# Patient Record
Sex: MALE | Race: WHITE | NOT HISPANIC OR LATINO | Employment: FULL TIME | URBAN - NONMETROPOLITAN AREA
[De-identification: names, ages, dates, MRNs, and addresses within clinical notes are randomized per-mention and may not be internally consistent; named-entity substitution may affect disease eponyms.]

---

## 2022-03-04 ENCOUNTER — APPOINTMENT (OUTPATIENT)
Dept: CT IMAGING | Facility: HOSPITAL | Age: 27
End: 2022-03-04

## 2022-03-04 ENCOUNTER — HOSPITAL ENCOUNTER (EMERGENCY)
Facility: HOSPITAL | Age: 27
Discharge: HOME | End: 2022-03-05
Attending: STUDENT IN AN ORGANIZED HEALTH CARE EDUCATION/TRAINING PROGRAM | Admitting: EMERGENCY MEDICINE

## 2022-03-04 DIAGNOSIS — R46.89 BEHAVIORAL CHANGE: Primary | ICD-10-CM

## 2022-03-04 DIAGNOSIS — T07.XXXA ABRASIONS OF MULTIPLE SITES: ICD-10-CM
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
## 2022-03-04 PROCEDURE — 90715 TDAP VACCINE 7 YRS/> IM: CPT | Performed by: STUDENT IN AN ORGANIZED HEALTH CARE EDUCATION/TRAINING PROGRAM

## 2022-03-04 PROCEDURE — 99285 EMERGENCY DEPT VISIT HI MDM: CPT

## 2022-03-04 PROCEDURE — 85025 COMPLETE CBC W/AUTO DIFF WBC: CPT | Performed by: STUDENT IN AN ORGANIZED HEALTH CARE EDUCATION/TRAINING PROGRAM

## 2022-03-04 PROCEDURE — 70486 CT MAXILLOFACIAL W/O DYE: CPT

## 2022-03-04 PROCEDURE — 72125 CT NECK SPINE W/O DYE: CPT

## 2022-03-04 PROCEDURE — 25010000002 ONDANSETRON PER 1 MG: Performed by: STUDENT IN AN ORGANIZED HEALTH CARE EDUCATION/TRAINING PROGRAM

## 2022-03-04 PROCEDURE — 85730 THROMBOPLASTIN TIME PARTIAL: CPT | Performed by: STUDENT IN AN ORGANIZED HEALTH CARE EDUCATION/TRAINING PROGRAM

## 2022-03-04 PROCEDURE — 70450 CT HEAD/BRAIN W/O DYE: CPT

## 2022-03-04 PROCEDURE — 72131 CT LUMBAR SPINE W/O DYE: CPT

## 2022-03-04 PROCEDURE — 74177 CT ABD & PELVIS W/CONTRAST: CPT

## 2022-03-04 PROCEDURE — 82077 ASSAY SPEC XCP UR&BREATH IA: CPT | Performed by: STUDENT IN AN ORGANIZED HEALTH CARE EDUCATION/TRAINING PROGRAM

## 2022-03-04 PROCEDURE — 96375 TX/PRO/DX INJ NEW DRUG ADDON: CPT

## 2022-03-04 PROCEDURE — 83735 ASSAY OF MAGNESIUM: CPT | Performed by: STUDENT IN AN ORGANIZED HEALTH CARE EDUCATION/TRAINING PROGRAM

## 2022-03-04 PROCEDURE — 80048 BASIC METABOLIC PNL TOTAL CA: CPT | Performed by: STUDENT IN AN ORGANIZED HEALTH CARE EDUCATION/TRAINING PROGRAM

## 2022-03-04 PROCEDURE — 71260 CT THORAX DX C+: CPT

## 2022-03-04 PROCEDURE — 72128 CT CHEST SPINE W/O DYE: CPT

## 2022-03-04 PROCEDURE — 25010000002 TETANUS-DIPHTH-ACELL PERTUSSIS 5-2.5-18.5 LF-MCG/0.5 SUSPENSION PREFILLED SYRINGE: Performed by: STUDENT IN AN ORGANIZED HEALTH CARE EDUCATION/TRAINING PROGRAM

## 2022-03-04 PROCEDURE — 90471 IMMUNIZATION ADMIN: CPT | Performed by: STUDENT IN AN ORGANIZED HEALTH CARE EDUCATION/TRAINING PROGRAM

## 2022-03-04 PROCEDURE — 96374 THER/PROPH/DIAG INJ IV PUSH: CPT

## 2022-03-04 PROCEDURE — 96376 TX/PRO/DX INJ SAME DRUG ADON: CPT

## 2022-03-04 PROCEDURE — 36415 COLL VENOUS BLD VENIPUNCTURE: CPT

## 2022-03-04 PROCEDURE — 80306 DRUG TEST PRSMV INSTRMNT: CPT | Performed by: STUDENT IN AN ORGANIZED HEALTH CARE EDUCATION/TRAINING PROGRAM

## 2022-03-04 PROCEDURE — 85610 PROTHROMBIN TIME: CPT | Performed by: STUDENT IN AN ORGANIZED HEALTH CARE EDUCATION/TRAINING PROGRAM

## 2022-03-04 RX ORDER — SODIUM CHLORIDE 0.9 % (FLUSH) 0.9 %
10 SYRINGE (ML) INJECTION AS NEEDED
Status: DISCONTINUED | OUTPATIENT
Start: 2022-03-04 | End: 2022-03-05 | Stop reason: HOSPADM

## 2022-03-04 RX ORDER — ONDANSETRON 2 MG/ML
4 INJECTION INTRAMUSCULAR; INTRAVENOUS ONCE
Status: COMPLETED | OUTPATIENT
Start: 2022-03-04 | End: 2022-03-04

## 2022-03-04 RX ADMIN — SODIUM CHLORIDE, POTASSIUM CHLORIDE, SODIUM LACTATE AND CALCIUM CHLORIDE 1000 ML: 600; 310; 30; 20 INJECTION, SOLUTION INTRAVENOUS at 23:58

## 2022-03-04 RX ADMIN — ONDANSETRON 4 MG: 2 INJECTION INTRAMUSCULAR; INTRAVENOUS at 23:57

## 2022-03-04 RX ADMIN — TETANUS TOXOID, REDUCED DIPHTHERIA TOXOID AND ACELLULAR PERTUSSIS VACCINE, ADSORBED 0.5 ML: 5; 2.5; 8; 8; 2.5 SUSPENSION INTRAMUSCULAR at 23:57

## 2022-03-05 ENCOUNTER — APPOINTMENT (OUTPATIENT)
Dept: GENERAL RADIOLOGY | Facility: HOSPITAL | Age: 27
End: 2022-03-05

## 2022-03-05 VITALS
WEIGHT: 190.2 LBS | BODY MASS INDEX: 25.76 KG/M2 | RESPIRATION RATE: 18 BRPM | HEART RATE: 88 BPM | OXYGEN SATURATION: 96 % | TEMPERATURE: 98.2 F | SYSTOLIC BLOOD PRESSURE: 142 MMHG | HEIGHT: 72 IN | DIASTOLIC BLOOD PRESSURE: 82 MMHG

## 2022-03-05 LAB
ALBUMIN SERPL-MCNC: 4.5 G/DL (ref 3.5–5.2)
ALBUMIN/GLOB SERPL: 2 G/DL
ALP SERPL-CCNC: 78 U/L (ref 39–117)
ALT SERPL W P-5'-P-CCNC: 23 U/L (ref 1–41)
AMPHET+METHAMPHET UR QL: NEGATIVE
AMPHETAMINES UR QL: NEGATIVE
ANION GAP SERPL CALCULATED.3IONS-SCNC: 15 MMOL/L (ref 5–15)
ANION GAP SERPL CALCULATED.3IONS-SCNC: 15 MMOL/L (ref 5–15)
APAP SERPL-MCNC: <5 MCG/ML (ref 0–30)
APTT PPP: 27.1 SECONDS (ref 20–40.3)
AST SERPL-CCNC: 41 U/L (ref 1–40)
BARBITURATES UR QL SCN: NEGATIVE
BASOPHILS # BLD AUTO: 0.04 10*3/MM3 (ref 0–0.2)
BASOPHILS NFR BLD AUTO: 0.4 % (ref 0–1.5)
BENZODIAZ UR QL SCN: POSITIVE
BILIRUB SERPL-MCNC: 0.5 MG/DL (ref 0–1.2)
BUN SERPL-MCNC: 6 MG/DL (ref 6–20)
BUN SERPL-MCNC: 6 MG/DL (ref 6–20)
BUN/CREAT SERPL: 7.5 (ref 7–25)
BUN/CREAT SERPL: 8.3 (ref 7–25)
BUPRENORPHINE SERPL-MCNC: NEGATIVE NG/ML
CALCIUM SPEC-SCNC: 8.8 MG/DL (ref 8.6–10.5)
CALCIUM SPEC-SCNC: 8.9 MG/DL (ref 8.6–10.5)
CANNABINOIDS SERPL QL: POSITIVE
CHLORIDE SERPL-SCNC: 102 MMOL/L (ref 98–107)
CHLORIDE SERPL-SCNC: 103 MMOL/L (ref 98–107)
CO2 SERPL-SCNC: 23 MMOL/L (ref 22–29)
CO2 SERPL-SCNC: 23 MMOL/L (ref 22–29)
COCAINE UR QL: NEGATIVE
CREAT SERPL-MCNC: 0.72 MG/DL (ref 0.76–1.27)
CREAT SERPL-MCNC: 0.8 MG/DL (ref 0.76–1.27)
DEPRECATED RDW RBC AUTO: 43.2 FL (ref 37–54)
EGFRCR SERPLBLD CKD-EPI 2021: 122.9 ML/MIN/1.73
EGFRCR SERPLBLD CKD-EPI 2021: 126.8 ML/MIN/1.73
EOSINOPHIL # BLD AUTO: 0.04 10*3/MM3 (ref 0–0.4)
EOSINOPHIL NFR BLD AUTO: 0.4 % (ref 0.3–6.2)
ERYTHROCYTE [DISTWIDTH] IN BLOOD BY AUTOMATED COUNT: 13.3 % (ref 12.3–15.4)
ETHANOL BLD-MCNC: 165 MG/DL (ref 0–10)
ETHANOL BLD-MCNC: 36 MG/DL (ref 0–10)
ETHANOL UR QL: 0.04 %
ETHANOL UR QL: 0.17 %
FLUAV SUBTYP SPEC NAA+PROBE: NOT DETECTED
FLUBV RNA ISLT QL NAA+PROBE: NOT DETECTED
GLOBULIN UR ELPH-MCNC: 2.2 GM/DL
GLUCOSE SERPL-MCNC: 92 MG/DL (ref 65–99)
GLUCOSE SERPL-MCNC: 98 MG/DL (ref 65–99)
HCT VFR BLD AUTO: 45.2 % (ref 37.5–51)
HGB BLD-MCNC: 15.5 G/DL (ref 13–17.7)
IMM GRANULOCYTES # BLD AUTO: 0.05 10*3/MM3 (ref 0–0.05)
IMM GRANULOCYTES NFR BLD AUTO: 0.5 % (ref 0–0.5)
INR PPP: 1.02 (ref 0.8–1.2)
LYMPHOCYTES # BLD AUTO: 1.81 10*3/MM3 (ref 0.7–3.1)
LYMPHOCYTES NFR BLD AUTO: 19.8 % (ref 19.6–45.3)
MAGNESIUM SERPL-MCNC: 2.3 MG/DL (ref 1.6–2.6)
MCH RBC QN AUTO: 30.7 PG (ref 26.6–33)
MCHC RBC AUTO-ENTMCNC: 34.3 G/DL (ref 31.5–35.7)
MCV RBC AUTO: 89.5 FL (ref 79–97)
METHADONE UR QL SCN: NEGATIVE
MONOCYTES # BLD AUTO: 0.77 10*3/MM3 (ref 0.1–0.9)
MONOCYTES NFR BLD AUTO: 8.4 % (ref 5–12)
NEUTROPHILS NFR BLD AUTO: 6.43 10*3/MM3 (ref 1.7–7)
NEUTROPHILS NFR BLD AUTO: 70.5 % (ref 42.7–76)
NRBC BLD AUTO-RTO: 0 /100 WBC (ref 0–0.2)
OPIATES UR QL: NEGATIVE
OXYCODONE UR QL SCN: NEGATIVE
PCP UR QL SCN: NEGATIVE
PLATELET # BLD AUTO: 331 10*3/MM3 (ref 140–450)
PMV BLD AUTO: 11.4 FL (ref 6–12)
POTASSIUM SERPL-SCNC: 3.7 MMOL/L (ref 3.5–5.2)
POTASSIUM SERPL-SCNC: 3.8 MMOL/L (ref 3.5–5.2)
PROPOXYPH UR QL: NEGATIVE
PROT SERPL-MCNC: 6.7 G/DL (ref 6–8.5)
PROTHROMBIN TIME: 13.3 SECONDS (ref 11.1–15.3)
RBC # BLD AUTO: 5.05 10*6/MM3 (ref 4.14–5.8)
SALICYLATES SERPL-MCNC: <0.3 MG/DL
SARS-COV-2 RNA PNL SPEC NAA+PROBE: NOT DETECTED
SODIUM SERPL-SCNC: 140 MMOL/L (ref 136–145)
SODIUM SERPL-SCNC: 141 MMOL/L (ref 136–145)
TRICYCLICS UR QL SCN: NEGATIVE
WBC NRBC COR # BLD: 9.14 10*3/MM3 (ref 3.4–10.8)

## 2022-03-05 PROCEDURE — 80053 COMPREHEN METABOLIC PANEL: CPT | Performed by: STUDENT IN AN ORGANIZED HEALTH CARE EDUCATION/TRAINING PROGRAM

## 2022-03-05 PROCEDURE — 73610 X-RAY EXAM OF ANKLE: CPT

## 2022-03-05 PROCEDURE — 80143 DRUG ASSAY ACETAMINOPHEN: CPT | Performed by: STUDENT IN AN ORGANIZED HEALTH CARE EDUCATION/TRAINING PROGRAM

## 2022-03-05 PROCEDURE — 87636 SARSCOV2 & INF A&B AMP PRB: CPT | Performed by: STUDENT IN AN ORGANIZED HEALTH CARE EDUCATION/TRAINING PROGRAM

## 2022-03-05 PROCEDURE — 82077 ASSAY SPEC XCP UR&BREATH IA: CPT | Performed by: STUDENT IN AN ORGANIZED HEALTH CARE EDUCATION/TRAINING PROGRAM

## 2022-03-05 PROCEDURE — 25010000002 MORPHINE PER 10 MG: Performed by: STUDENT IN AN ORGANIZED HEALTH CARE EDUCATION/TRAINING PROGRAM

## 2022-03-05 PROCEDURE — 90792 PSYCH DIAG EVAL W/MED SRVCS: CPT | Performed by: PSYCHIATRY & NEUROLOGY

## 2022-03-05 PROCEDURE — 25010000002 MORPHINE PER 10 MG: Performed by: EMERGENCY MEDICINE

## 2022-03-05 PROCEDURE — 96376 TX/PRO/DX INJ SAME DRUG ADON: CPT

## 2022-03-05 PROCEDURE — 80179 DRUG ASSAY SALICYLATE: CPT | Performed by: STUDENT IN AN ORGANIZED HEALTH CARE EDUCATION/TRAINING PROGRAM

## 2022-03-05 PROCEDURE — 25010000002 IOPAMIDOL 61 % SOLUTION: Performed by: STUDENT IN AN ORGANIZED HEALTH CARE EDUCATION/TRAINING PROGRAM

## 2022-03-05 PROCEDURE — 96375 TX/PRO/DX INJ NEW DRUG ADDON: CPT

## 2022-03-05 PROCEDURE — 73564 X-RAY EXAM KNEE 4 OR MORE: CPT

## 2022-03-05 RX ORDER — LIDOCAINE HYDROCHLORIDE 10 MG/ML
10 INJECTION, SOLUTION EPIDURAL; INFILTRATION; INTRACAUDAL; PERINEURAL ONCE
Status: COMPLETED | OUTPATIENT
Start: 2022-03-05 | End: 2022-03-05

## 2022-03-05 RX ORDER — ZOLPIDEM TARTRATE 10 MG/1
10 TABLET ORAL
COMMUNITY

## 2022-03-05 RX ORDER — LORAZEPAM 1 MG/1
1 TABLET ORAL
COMMUNITY

## 2022-03-05 RX ORDER — IBUPROFEN 800 MG/1
800 TABLET ORAL ONCE
Status: COMPLETED | OUTPATIENT
Start: 2022-03-05 | End: 2022-03-05

## 2022-03-05 RX ORDER — IBUPROFEN 800 MG/1
800 TABLET ORAL EVERY 8 HOURS PRN
Qty: 21 TABLET | Refills: 0 | Status: SHIPPED | OUTPATIENT
Start: 2022-03-05

## 2022-03-05 RX ORDER — ORPHENADRINE CITRATE 100 MG/1
100 TABLET, EXTENDED RELEASE ORAL 2 TIMES DAILY PRN
Qty: 15 TABLET | Refills: 0 | Status: SHIPPED | OUTPATIENT
Start: 2022-03-05

## 2022-03-05 RX ORDER — HYDROXYZINE PAMOATE 50 MG/1
50 CAPSULE ORAL
COMMUNITY

## 2022-03-05 RX ADMIN — MORPHINE SULFATE 4 MG: 4 INJECTION INTRAVENOUS at 04:13

## 2022-03-05 RX ADMIN — IBUPROFEN 800 MG: 800 TABLET, FILM COATED ORAL at 08:30

## 2022-03-05 RX ADMIN — IOPAMIDOL 90 ML: 612 INJECTION, SOLUTION INTRAVENOUS at 01:50

## 2022-03-05 RX ADMIN — MORPHINE SULFATE 4 MG: 4 INJECTION, SOLUTION INTRAMUSCULAR; INTRAVENOUS at 09:48

## 2022-03-05 RX ADMIN — LIDOCAINE HYDROCHLORIDE 10 ML: 10 INJECTION, SOLUTION EPIDURAL; INFILTRATION; INTRACAUDAL; PERINEURAL at 05:23

## 2022-03-05 RX ADMIN — MORPHINE SULFATE 4 MG: 4 INJECTION, SOLUTION INTRAMUSCULAR; INTRAVENOUS at 06:01
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
## 2022-03-05 NOTE — ED NOTES
Pt [place] to the ED [place] Pt states he struggles with depression regularly, takes medication, and sees a therapist. Pt states he is currently studying to get his PHD and is going to have to withdrawal from the program and is very stressed out. Pt states he is calmed down now and does not want to hurt himself or anyone else. He states not being currently suicidal but has had thoughts in the past patient does not have a plan.     Lisa Schroeder, RN  03/05/22 0059

## 2022-03-05 NOTE — ED NOTES
Pt placed in yellow gown, yellow socks. Pts belongings removed, pts girlfriend took belonging to Lisa Hanley, RN  03/05/22 4602

## 2022-03-05 NOTE — ED NOTES
This tech is continuously monitoring the patient 1:1. Patient is resting comfortably.       Yomaira Ventura, PCT  03/05/22 0616

## 2022-03-05 NOTE — EXTERNAL PATIENT INSTRUCTIONS
Patient Education   Table of Contents       Depression and Anxiety     To view videos and all your education online visit,   [email or web address]   or scan this QR code with your smartphone.
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
## 2022-03-05 NOTE — ED NOTES
This tech is sitting 1:1. Patients head is exposed and patient has no loose bandages at this time. All needs met.     Shalini Poon  03/05/22 0702

## 2022-03-05 NOTE — EXTERNAL PATIENT INSTRUCTIONS
Patient Education   Table of Contents       Abrasion       Head Injury, Adult     To view videos and all your education online visit,   [email or web address]   or scan this QR code with your smartphone.                  Abrasion     An abrasion is a cut or a scrape on the surface of the skin. An abrasion does not go through all the layers of the skin. It is important to care for an abrasion properly to prevent infection.   What are the causes?   This condition is caused by rubbing your skin on something or falling on a surface, such as the ground. When your skin rubs on something, some layers of skin may rub off.   What are the signs or symptoms?   The main symptom of this condition is a cut or a scrape. The cut or scrape may be bleeding, or it may appear red or pink. If your abrasion was caused by a fall, there may be a bruise under your cut or scrape.   How is this diagnosed?   An abrasion is diagnosed with a physical exam.   How is this treated?    Treatment for this condition depends on how large and deep the abrasion is. In most cases:       Your abrasion will be cleaned with water and mild soap. This is done to remove any dirt or debris (such as tiny bits of glass or rock) that may be stuck in your wound.       An antibiotic ointment may be applied to your abrasion to help prevent infection.       A bandage (dressing) may be placed on your abrasion to keep it clean.     You may also need a tetanus shot.   Follow these instructions at home:   Medicines         Take or apply over-the-counter and prescription medicines only as told by your health care provider.       If you were prescribed an antibiotic medicine, use it as told by your health care provider. Do not  stop using the antibiotic even if you start to feel better.     Wound care         Clean your wound 1 or 2 times a day, or as told by your health care provider. To do this:     Wash your hands for at least 20 seconds with mild soap and water.  Do this before and after you clean your wound.       Wash your wound with mild soap and water and then rinse off the soap.       Pat your wound dry with a clean towel. Do not  rub your wound.         Keep your dressing clean and dry as told by your health care provider.       There are many different ways to close and cover a wound. Follow instructions from your health care provider about caring for your wound and about changing and removing your dressing. You may have to change your dressing one or more times a day, or as directed by your health care provider.      Check your wound every day for signs of infection. Check for:       Redness, especially a red streak that spreads out from your wound.       Swelling or increased pain.       Warmth.       Blood, fluid, pus, or a bad smell.     Managing pain and swelling           If directed, put ice on the injured area. To do this:       Put ice in a plastic bag.       Place a towel between your skin and the bag.       Leave the ice on for 20 minutes, 2?3 times a day.       Remove the ice if your skin turns bright red. This is very important. If you cannot feel pain, heat, or cold, you have a greater risk of damage to the area.       If possible, raise (elevate) the injured area above the level of your heart while you are sitting or lying down.     General instructions        Do not  take baths, swim, or use a hot tub until your health care provider approves. Ask your doctor about taking showers or sponge baths.       Keep all follow-up visits. This is important.       Contact a health care provider if:         You received a tetanus shot, and you have swelling, severe pain, redness, or bleeding at your injection site.       Your pain is not controlled with medicine.       You have a fever.      You have any of these signs of infection:       Redness, swelling, or more pain around your wound.       Warmth coming from your wound.       Blood, fluid, pus, or a bad smell  coming from your wound.     Get help right away if:         You have a red streak spreading away from your wound.     Summary         An abrasion is a cut or a scrape on the surface of the skin. Care for your abrasion properly to prevent infection.       Clean your wound with mild soap and water 1 or 2 times a day or as often as told. Follow instructions from your health care provider about taking medicines and changing your bandage (dressing).       Contact your health care provider if you have a fever or if you have redness, swelling, or more pain around your wound.       Contact your health care provider if you have warmth, blood, fluid, pus, or a bad smell coming from your wound.       Get help right away if there is a red streak spreading away from your wound.     This information is not intended to replace advice given to you by your health care provider. Make sure you discuss any questions you have with your health care provider.     Document Released: 09/27/2006Document Revised: 03/18/2021Document Reviewed: 03/18/2021     [employer] Patient Education ? 2021 [employer]         Head Injury, Adult         There are many types of head injuries. They can be as minor as a small bump. Some head injuries can be worse. Worse injuries include:       A strong hit to the head that shakes the brain back and forth, causing damage (concussion).       A bruise (contusion) of the brain. This means there is bleeding in the brain that can cause swelling.       A cracked skull (skull fracture).       Bleeding in the brain that gathers, gets thick (makes a clot), and forms a bump (hematoma).     Most problems from a head injury come in the first 24 hours. However, you may still have side effects up to 7?10 days after your injury. It is important to watch your condition for any changes. You may need to be watched in the emergency department or urgent care, or you may need to stay in the hospital.     What are the causes?     There are many possible causes of a head injury. A serious head injury may be caused by:       A car accident.       Bicycle or motorcycle accidents.       Sports injuries.       Falls.       Being hit by an object.     What are the signs or symptoms?    Symptoms of a head injury include a bruise, bump, or bleeding where the injury happened. Other physical symptoms may include:       Headache.       Feeling like you may vomit (nauseous) or vomiting.       Dizziness.       Blurred or double vision.       Being uncomfortable around bright lights or loud noises.       Shaking movements that you cannot control (seizures).       Feeling tired.       Trouble being woken up.       Fainting or loss of consciousness.      Mental or emotional symptoms may include:       Feeling grumpy or cranky.       Confusion and memory problems.       Having trouble paying attention or concentrating.       Changes in eating or sleeping habits.       Feeling worried or nervous (anxious).       Feeling sad (depressed).     How is this treated?   Treatment for this condition depends on how severe the injury is and the type of injury you have. The main goal is to prevent problems and to allow the brain time to heal.   Mild head injury    If you have a mild head injury, you may be sent home, and treatment may include:       Being watched. A responsible adult should stay with you for 24 hours after your injury and check on you often.       Physical rest.       Brain rest.       Pain medicines.     Severe head injury    If you have a severe head injury, treatment may include:       Being watched closely. This includes staying in the hospital.      Medicines to:       Help with pain.       Prevent seizures.       Help with brain swelling.       Protecting your airway and using a machine that helps you breathe (ventilator).       Treatments to watch for and manage swelling inside the brain.      Brain surgery. This may be needed to:       Remove a  collection of blood or blood clots.       Stop the bleeding.       Remove a part of the skull. This allows room for the brain to swell.     Follow these instructions at home:   Activity         Rest.       Avoid activities that are hard or tiring.       Make sure you get enough sleep.      Let your brain rest. Do this by limiting activities that need a lot of thought or attention, such as:       Watching TV.       Playing memory games and puzzles.       Job-related work or homework.       Working on the computer, social media, and texting.       Avoid activities that could cause another head injury until your doctor says it is okay. This includes playing sports. Having another head injury, especially before the first one has healed, can be dangerous.       Ask your doctor when it is safe for you to go back to your normal activities, such as work or school. Ask your doctor for a step-by-step plan for slowly going back to your normal activities.       Ask your doctor when you can drive, ride a bicycle, or use heavy machinery. Do not  do these activities if you are dizzy.     Lifestyle           Do not  drink alcohol until your doctor says it is okay.      Do not  use drugs.       If it is harder than usual to remember things, write them down.       If you are easily distracted, try to do one thing at a time.       Talk with family members or close friends when making important decisions.       Tell your friends, family, a trusted co-worker, and  about your injury, symptoms, and limits (restrictions). Have them watch for any problems that are new or getting worse.     General instructions         Take over-the-counter and prescription medicines only as told by your doctor.       Have someone stay with you for 24 hours after your head injury. This person should watch you for any changes in your symptoms and be ready to get help.       Keep all follow-up visits as told by your doctor. This is important.      How is this prevented?         Work on your balance and strength. This can help you avoid falls.       Wear a seat belt when you are in a moving vehicle.      Wear a helmet when you:       Ride a bicycle.       Ski.       Do any other sport or activity that has a risk of injury.      If you drink alcohol:      Limit how much you use to:       0?1 drink a day for nonpregnant women.       0?2 drinks a day for men.       Be aware of how much alcohol is in your drink. In the [place] one drink equals one 12 oz bottle of beer (355 mL), one 5 oz glass of wine (148 mL), or one 1? oz glass of hard liquor (44 mL).      Make your home safer by:       Getting rid of clutter from the floors and stairs. This includes things that can make you trip.       Using grab bars in bathrooms and handrails by stairs.       Placing non-slip mats on floors and in bathtubs.       Putting more light in dim areas.       Where to find more information         Centers for Disease Control and Prevention: www.cdc.gov       Get help right away if:        You have:       A very bad headache that is not helped by medicine.       Trouble walking or weakness in your arms and legs.       Clear or bloody fluid coming from your nose or ears.       Changes in how you see (vision).       A seizure.       More confusion or more grumpy moods.       Your symptoms get worse.       You are sleepier than normal and have trouble staying awake.       You lose your balance.       The black centers of your eyes (pupils) change in size.       Your speech is slurred.       Your dizziness gets worse.       You vomit.     These symptoms may be an emergency. Do not wait to see if the symptoms will go away. Get medical help right away. Call your local emergency services (911 in the [place] Do not drive yourself to the hospital.   Summary         Head injuries can be as minor as a small bump. Some head injuries can be worse.       Treatment for this condition depends on how  severe the injury is and the type of injury you have.       Have someone stay with you for 24 hours after your head injury.       Ask your doctor when it is safe for you to go back to your normal activities, such as work or school.       To prevent a head injury, wear a seat belt in a car, wear a helmet when you use a bicycle, limit your alcohol use, and make your home safer.     This information is not intended to replace advice given to you by your health care provider. Make sure you discuss any questions you have with your health care provider.     Document Released: 11/30/2009Document Revised: 10/30/2020Document Reviewed: 10/30/2020     [employer] Patient Education ? 2021 [employer]